# Patient Record
Sex: MALE | Race: WHITE | NOT HISPANIC OR LATINO | URBAN - METROPOLITAN AREA
[De-identification: names, ages, dates, MRNs, and addresses within clinical notes are randomized per-mention and may not be internally consistent; named-entity substitution may affect disease eponyms.]

---

## 2021-02-05 ENCOUNTER — INPATIENT (INPATIENT)
Facility: HOSPITAL | Age: 61
LOS: 0 days | Discharge: HOME | End: 2021-02-06
Payer: MEDICAID

## 2021-02-05 PROCEDURE — 93010 ELECTROCARDIOGRAM REPORT: CPT

## 2021-02-05 PROCEDURE — 70450 CT HEAD/BRAIN W/O DYE: CPT | Mod: 26

## 2021-02-05 PROCEDURE — 71045 X-RAY EXAM CHEST 1 VIEW: CPT | Mod: 26

## 2021-02-05 PROCEDURE — 72125 CT NECK SPINE W/O DYE: CPT | Mod: 26

## 2021-02-05 PROCEDURE — 99285 EMERGENCY DEPT VISIT HI MDM: CPT

## 2021-02-06 PROCEDURE — 93306 TTE W/DOPPLER COMPLETE: CPT | Mod: 26

## 2021-02-06 PROCEDURE — 93880 EXTRACRANIAL BILAT STUDY: CPT | Mod: 26

## 2021-02-06 PROCEDURE — 99223 1ST HOSP IP/OBS HIGH 75: CPT

## 2021-03-08 PROBLEM — Z00.00 ENCOUNTER FOR PREVENTIVE HEALTH EXAMINATION: Status: ACTIVE | Noted: 2021-03-08

## 2022-12-20 ENCOUNTER — INPATIENT (INPATIENT)
Facility: HOSPITAL | Age: 62
LOS: 0 days | Discharge: HOME | DRG: 176 | End: 2022-12-21
Attending: HOSPITALIST | Admitting: HOSPITALIST
Payer: MEDICAID

## 2022-12-20 VITALS
RESPIRATION RATE: 18 BRPM | DIASTOLIC BLOOD PRESSURE: 79 MMHG | SYSTOLIC BLOOD PRESSURE: 132 MMHG | HEART RATE: 63 BPM | OXYGEN SATURATION: 96 % | WEIGHT: 164.91 LBS | TEMPERATURE: 98 F

## 2022-12-20 DIAGNOSIS — I26.99 OTHER PULMONARY EMBOLISM WITHOUT ACUTE COR PULMONALE: ICD-10-CM

## 2022-12-20 DIAGNOSIS — Z98.890 OTHER SPECIFIED POSTPROCEDURAL STATES: Chronic | ICD-10-CM

## 2022-12-20 DIAGNOSIS — R09.89 OTHER SPECIFIED SYMPTOMS AND SIGNS INVOLVING THE CIRCULATORY AND RESPIRATORY SYSTEMS: ICD-10-CM

## 2022-12-20 LAB
ALBUMIN SERPL ELPH-MCNC: 3 G/DL — LOW (ref 3.3–5.2)
ALP SERPL-CCNC: 52 U/L — SIGNIFICANT CHANGE UP (ref 40–120)
ALT FLD-CCNC: 77 U/L — HIGH
ANION GAP SERPL CALC-SCNC: 7 MMOL/L — SIGNIFICANT CHANGE UP (ref 5–17)
APTT BLD: 49.8 SEC — HIGH (ref 27.5–35.5)
APTT BLD: 70.8 SEC — HIGH (ref 27.5–35.5)
AST SERPL-CCNC: 36 U/L — SIGNIFICANT CHANGE UP
BASOPHILS # BLD AUTO: 0.03 K/UL — SIGNIFICANT CHANGE UP (ref 0–0.2)
BASOPHILS NFR BLD AUTO: 0.5 % — SIGNIFICANT CHANGE UP (ref 0–2)
BILIRUB SERPL-MCNC: 0.5 MG/DL — SIGNIFICANT CHANGE UP (ref 0.4–2)
BUN SERPL-MCNC: 18.1 MG/DL — SIGNIFICANT CHANGE UP (ref 8–20)
CALCIUM SERPL-MCNC: 7.9 MG/DL — LOW (ref 8.4–10.5)
CHLORIDE SERPL-SCNC: 113 MMOL/L — HIGH (ref 96–108)
CO2 SERPL-SCNC: 24 MMOL/L — SIGNIFICANT CHANGE UP (ref 22–29)
CREAT SERPL-MCNC: 0.7 MG/DL — SIGNIFICANT CHANGE UP (ref 0.5–1.3)
EGFR: 105 ML/MIN/1.73M2 — SIGNIFICANT CHANGE UP
EOSINOPHIL # BLD AUTO: 0.02 K/UL — SIGNIFICANT CHANGE UP (ref 0–0.5)
EOSINOPHIL NFR BLD AUTO: 0.4 % — SIGNIFICANT CHANGE UP (ref 0–6)
GLUCOSE SERPL-MCNC: 102 MG/DL — HIGH (ref 70–99)
HCT VFR BLD CALC: 33.7 % — LOW (ref 39–50)
HCT VFR BLD CALC: 35 % — LOW (ref 39–50)
HGB BLD-MCNC: 11.2 G/DL — LOW (ref 13–17)
HGB BLD-MCNC: 11.5 G/DL — LOW (ref 13–17)
IMM GRANULOCYTES NFR BLD AUTO: 0.4 % — SIGNIFICANT CHANGE UP (ref 0–0.9)
INR BLD: 1.24 RATIO — HIGH (ref 0.88–1.16)
LYMPHOCYTES # BLD AUTO: 1.34 K/UL — SIGNIFICANT CHANGE UP (ref 1–3.3)
LYMPHOCYTES # BLD AUTO: 24.1 % — SIGNIFICANT CHANGE UP (ref 13–44)
MCHC RBC-ENTMCNC: 31.9 PG — SIGNIFICANT CHANGE UP (ref 27–34)
MCHC RBC-ENTMCNC: 32 PG — SIGNIFICANT CHANGE UP (ref 27–34)
MCHC RBC-ENTMCNC: 32.9 GM/DL — SIGNIFICANT CHANGE UP (ref 32–36)
MCHC RBC-ENTMCNC: 33.2 GM/DL — SIGNIFICANT CHANGE UP (ref 32–36)
MCV RBC AUTO: 96.3 FL — SIGNIFICANT CHANGE UP (ref 80–100)
MCV RBC AUTO: 97.2 FL — SIGNIFICANT CHANGE UP (ref 80–100)
MONOCYTES # BLD AUTO: 0.41 K/UL — SIGNIFICANT CHANGE UP (ref 0–0.9)
MONOCYTES NFR BLD AUTO: 7.4 % — SIGNIFICANT CHANGE UP (ref 2–14)
NEUTROPHILS # BLD AUTO: 3.73 K/UL — SIGNIFICANT CHANGE UP (ref 1.8–7.4)
NEUTROPHILS NFR BLD AUTO: 67.2 % — SIGNIFICANT CHANGE UP (ref 43–77)
NT-PROBNP SERPL-SCNC: 692 PG/ML — HIGH (ref 0–300)
PLATELET # BLD AUTO: 207 K/UL — SIGNIFICANT CHANGE UP (ref 150–400)
PLATELET # BLD AUTO: 240 K/UL — SIGNIFICANT CHANGE UP (ref 150–400)
POTASSIUM SERPL-MCNC: 3.4 MMOL/L — LOW (ref 3.5–5.3)
POTASSIUM SERPL-SCNC: 3.4 MMOL/L — LOW (ref 3.5–5.3)
PROT SERPL-MCNC: 5.2 G/DL — LOW (ref 6.6–8.7)
PROTHROM AB SERPL-ACNC: 14.4 SEC — HIGH (ref 10.5–13.4)
RBC # BLD: 3.5 M/UL — LOW (ref 4.2–5.8)
RBC # BLD: 3.6 M/UL — LOW (ref 4.2–5.8)
RBC # FLD: 12.4 % — SIGNIFICANT CHANGE UP (ref 10.3–14.5)
RBC # FLD: 12.4 % — SIGNIFICANT CHANGE UP (ref 10.3–14.5)
SODIUM SERPL-SCNC: 144 MMOL/L — SIGNIFICANT CHANGE UP (ref 135–145)
TROPONIN T SERPL-MCNC: <0.01 NG/ML — SIGNIFICANT CHANGE UP (ref 0–0.06)
WBC # BLD: 5.55 K/UL — SIGNIFICANT CHANGE UP (ref 3.8–10.5)
WBC # BLD: 6.31 K/UL — SIGNIFICANT CHANGE UP (ref 3.8–10.5)
WBC # FLD AUTO: 5.55 K/UL — SIGNIFICANT CHANGE UP (ref 3.8–10.5)
WBC # FLD AUTO: 6.31 K/UL — SIGNIFICANT CHANGE UP (ref 3.8–10.5)

## 2022-12-20 PROCEDURE — 93970 EXTREMITY STUDY: CPT | Mod: 26

## 2022-12-20 PROCEDURE — 93306 TTE W/DOPPLER COMPLETE: CPT | Mod: 26

## 2022-12-20 PROCEDURE — 99285 EMERGENCY DEPT VISIT HI MDM: CPT

## 2022-12-20 PROCEDURE — 99221 1ST HOSP IP/OBS SF/LOW 40: CPT

## 2022-12-20 PROCEDURE — 99223 1ST HOSP IP/OBS HIGH 75: CPT

## 2022-12-20 PROCEDURE — 93010 ELECTROCARDIOGRAM REPORT: CPT

## 2022-12-20 RX ORDER — HEPARIN SODIUM 5000 [USP'U]/ML
INJECTION INTRAVENOUS; SUBCUTANEOUS
Qty: 25000 | Refills: 0 | Status: DISCONTINUED | OUTPATIENT
Start: 2022-12-20 | End: 2022-12-21

## 2022-12-20 RX ORDER — ALPRAZOLAM 0.25 MG
0.5 TABLET ORAL EVERY 12 HOURS
Refills: 0 | Status: DISCONTINUED | OUTPATIENT
Start: 2022-12-20 | End: 2022-12-21

## 2022-12-20 RX ORDER — MAGNESIUM SULFATE 500 MG/ML
2 VIAL (ML) INJECTION ONCE
Refills: 0 | Status: COMPLETED | OUTPATIENT
Start: 2022-12-20 | End: 2022-12-20

## 2022-12-20 RX ORDER — HEPARIN SODIUM 5000 [USP'U]/ML
6000 INJECTION INTRAVENOUS; SUBCUTANEOUS ONCE
Refills: 0 | Status: DISCONTINUED | OUTPATIENT
Start: 2022-12-20 | End: 2022-12-20

## 2022-12-20 RX ORDER — POTASSIUM CHLORIDE 20 MEQ
40 PACKET (EA) ORAL ONCE
Refills: 0 | Status: COMPLETED | OUTPATIENT
Start: 2022-12-20 | End: 2022-12-20

## 2022-12-20 RX ORDER — HEPARIN SODIUM 5000 [USP'U]/ML
3000 INJECTION INTRAVENOUS; SUBCUTANEOUS EVERY 6 HOURS
Refills: 0 | Status: DISCONTINUED | OUTPATIENT
Start: 2022-12-20 | End: 2022-12-21

## 2022-12-20 RX ORDER — HEPARIN SODIUM 5000 [USP'U]/ML
6000 INJECTION INTRAVENOUS; SUBCUTANEOUS EVERY 6 HOURS
Refills: 0 | Status: DISCONTINUED | OUTPATIENT
Start: 2022-12-20 | End: 2022-12-21

## 2022-12-20 RX ORDER — POTASSIUM CHLORIDE 20 MEQ
10 PACKET (EA) ORAL
Refills: 0 | Status: COMPLETED | OUTPATIENT
Start: 2022-12-20 | End: 2022-12-20

## 2022-12-20 RX ADMIN — Medication 0.5 MILLIGRAM(S): at 23:51

## 2022-12-20 RX ADMIN — Medication 25 GRAM(S): at 12:06

## 2022-12-20 RX ADMIN — Medication 100 MILLIEQUIVALENT(S): at 17:38

## 2022-12-20 RX ADMIN — HEPARIN SODIUM 1500 UNIT(S)/HR: 5000 INJECTION INTRAVENOUS; SUBCUTANEOUS at 23:25

## 2022-12-20 RX ADMIN — HEPARIN SODIUM 1500 UNIT(S)/HR: 5000 INJECTION INTRAVENOUS; SUBCUTANEOUS at 22:00

## 2022-12-20 RX ADMIN — HEPARIN SODIUM 1300 UNIT(S)/HR: 5000 INJECTION INTRAVENOUS; SUBCUTANEOUS at 12:35

## 2022-12-20 RX ADMIN — HEPARIN SODIUM 3000 UNIT(S): 5000 INJECTION INTRAVENOUS; SUBCUTANEOUS at 21:11

## 2022-12-20 RX ADMIN — Medication 40 MILLIEQUIVALENT(S): at 15:41

## 2022-12-20 RX ADMIN — Medication 2 GRAM(S): at 14:00

## 2022-12-20 RX ADMIN — Medication 100 MILLIEQUIVALENT(S): at 15:00

## 2022-12-20 RX ADMIN — Medication 100 MILLIEQUIVALENT(S): at 14:00

## 2022-12-20 NOTE — ED PROVIDER NOTE - OBJECTIVE STATEMENT
62-year-old male no past medical history  transferred from [facility] for bilateral PEs on heparin.  Patient went to [facility] yesterday for altered mental status admitted to taking Gummies marijuana and Xanax.  Had an episode of hypoxia and hypotension in the ED got CTA that showed bilateral PEs.  Patient currently denies any symptoms states that he was just recently admitted to [facility] last week because of the flu and dehydration.   No history of blood clots in the past.  No sick contacts no recent travel. Denies f/c/n/v/cp/sob/palpitations/rash/headache/dizziness/abd.pain/d/c/dysuria/hematuria. notes cough is improving. no si/hi takes xanax as notes a lot is going on in his life so it helps him relax.

## 2022-12-20 NOTE — ED PROVIDER NOTE - CLINICAL SUMMARY MEDICAL DECISION MAKING FREE TEXT BOX
BL PEs with right chambers enlargement transferred on heparin from [facility]. PERT team Dr. Retana aware will see the pt; labs, echo admit currently hemodynamically stable

## 2022-12-20 NOTE — ED PROVIDER NOTE - PROGRESS NOTE DETAILS
stable on reassessment, hemodynamically stable. labs reviewed. echo pending. cardiology consulted with recommendation for admission to telemetry. spoke with hospitalist for admission. -DO Sita

## 2022-12-20 NOTE — H&P ADULT - NSHPLABSRESULTS_GEN_ALL_CORE
CT angio Chest 12/19  FINDINGS:    LUNGS AND AIRWAYS: Patent central airways.  Right lower lobe   bronchiectasis. Scattered subsegmental atelectasis.  PLEURA: No pleural effusion.  MEDIASTINUM AND GERMAN: No lymphadenopathy.  VESSELS: There are filling defects identified within first and second   branches of bilateral upper, lower lobe as well as a right middle lobe   pulmonary arteries and lobar branches, compatible with pulmonary emboli.   Filling defects identified within right and left pulmonary artery, subtle   thrombus difficult to exclude.  HEART: Mild cardiomegaly with enlargement the right cardiac chambers.   Recommend correlation with echocardiogram to exclude right heart strain.   Prominent aortic root measuring up to 3.9 cm. No pericardial effusion.  CHEST WALL AND LOWER NECK: Within normal limits.  VISUALIZED UPPER ABDOMEN: Within normal limits.  BONES: Multilevel degenerative changes of the spine.    IMPRESSION:    Findings compatible with bilateral pulmonary emboli as detailed above.    Mild cardiomegaly with enlargement the right cardiac chambers. Recommend   correlation with echocardiogram to exclude right heart strain.

## 2022-12-20 NOTE — CONSULT NOTE ADULT - SUBJECTIVE AND OBJECTIVE BOX
[facility] - Cardiology Team Note                                                              [facility]/[facility] Faculty Practice                                                         Office:  [address] [facility] [facility]                                                                     Telephone: [phone]. Fax:[phone]                                                                 [facility] CARDIOLOGY CONSULTATION NOTE                                                                                             Consult requested by:  Dr. Ruiz  Reason for Consultation: PE  History obtained by: Patient and medical record   obtained: No  Hospital Transferred from: [facility]  On call accepting attending (if transferred): Dr. Retana    CC:  PE    HPI:    63 y/o M with [facility] Lyme's Dsease who presents to [facility] ED with B/L pulmonary embolism, transfer from [facility]. Per history,      Risk Factors:  Family Hx of Thrombophilia   Recent long haul travel   Immobility   Prior hx of VTE   Cancer screening: Colonoscopy [] Mammogram [] Papsmear [] PSA []      ALLERGIES: Allergies    Allergy Status Unknown    Intolerances          PAST MEDICAL HISTORY  No pertinent past medical history    No pertinent past medical history        PAST SURGICAL HISTORY  S/P hernia repair    H/O shoulder surgery        FAMILY HISTORY:  No pertinent family history in first degree relatives        SOCIAL HISTORY:  Denies smoking/alcohol/drugs  CIGARETTES:     ALCOHOL:  DRUGS:                                        CURRENT MEDICATIONS:     heparin  Infusion.  potassium chloride    Tablet ER  potassium chloride  10 mEq/100 mL IVPB        HOME MEDICATIONS:  Vital Signs Last 24 Hrs  T(C): 36.9 (20 Dec 2022 10:09), Max: 36.9 (20 Dec 2022 10:09)  T(F): 98.4 (20 Dec 2022 10:09), Max: 98.4 (20 Dec 2022 10:09)  HR: 63 (20 Dec 2022 10:09) (63 - 63)  BP: 132/79 (20 Dec 2022 10:09) (132/79 - 132/79)  BP(mean): --  RR: 18 (20 Dec 2022 10:09) (18 - 18)  SpO2: 96% (20 Dec 2022 10:09) (96% - 96%)    Parameters below as of 20 Dec 2022 10:09  Patient On (Oxygen Delivery Method): room air          Review of Systems    [ x] All others negative	  [ ] Unable to obtain    CONSTITUTIONAL: No fever, weight loss, or fatigue  EYES: No eye pain, visual disturbances, or discharge  ENT:  No difficulty hearing, tinnitus, vertigo; No sinus or throat pain  NECK: No pain or stiffness  RESPIRATORY:    CARDIOVASCULAR:    GASTROINTESTINAL: No abdominal or epigastric pain. No nausea, vomiting, or hematemesis; No diarrhea or constipation. No melena or hematochezia.  GENITOURINARY: No dysuria, frequency, hematuria, or incontinence  NEUROLOGICAL: No headaches, memory loss, loss of strength, numbness, or tremors  SKIN:   LYMPH Nodes: No enlarged glands  ENDOCRINE: No heat or cold intolerance; No hair loss  MUSCULOSKELETAL: No joint pain or swelling; No muscle, back, or extremity pain  PSYCHIATRIC: No depression, anxiety, mood swings, or difficulty sleeping  HEME/LYMPH: No easy bruising, or bleeding gums  ALLERGY AND IMMUNOLOGIC: No hives or eczema    PHYSICAL EXAM:  Vital Signs Last 24 Hrs  T(C): 36.9 (20 Dec 2022 10:09), Max: 36.9 (20 Dec 2022 10:09)  T(F): 98.4 (20 Dec 2022 10:09), Max: 98.4 (20 Dec 2022 10:09)  HR: 63 (20 Dec 2022 10:09) (63 - 63)  BP: 132/79 (20 Dec 2022 10:09) (132/79 - 132/79)  BP(mean): --  RR: 18 (20 Dec 2022 10:09) (18 - 18)  SpO2: 96% (20 Dec 2022 10:09) (96% - 96%)    Parameters below as of 20 Dec 2022 10:09  Patient On (Oxygen Delivery Method): room air        Constitutional: Comfortable . No acute distress.   HEENT: Atraumatic and normocephalic , neck is supple . no JVD. No carotid bruit. PEERL   CNS: A&Ox3. No focal deficits. EOMI. Cranial nerves II-IX are intact.   Lymph Nodes: Cervical : Not palpable.  Respiratory: CTAB  Cardiovascular: S1S2 RRR. No murmur/rubs or gallop.  Gastrointestinal: Soft non-tender and non distended . +Bowel sounds. negative Ragland's sign.  Extremities: No edema.   Psychiatric: Calm . no agitation.  Skin: No skin rash/ulcers visualized to face, hands or feet.    Vascular Pulse Exam:  Right DP: []palpable []non-palpable []audible      Left DP :   []palpable []non-palpable []audible  Right PT: []palpable [] non-palpable []audible   Left PT:  [] palpable [] non-palpable []audible         Foot Exam:        Intake and output:     LABS:                        11.2   5.55  )-----------( 207      ( 20 Dec 2022 11:00 )             33.7     12-20    144  |  113<H>  |  18.1  ----------------------------<  102<H>  3.4<L>   |  24.0  |  0.70    Ca    7.9<L>      20 Dec 2022 11:00    TPro  5.2<L>  /  Alb  3.0<L>  /  TBili  0.5  /  DBili  x   /  AST  36  /  ALT  77<H>  /  AlkPhos  52  12-20    CARDIAC MARKERS ( 20 Dec 2022 11:00 )  x     / <0.01 ng/mL / x     / x     / x        ;p-BNP=Serum Pro-Brain Natriuretic Peptide: 692 pg/mL (12-20 @ 11:00)    PT/INR - ( 20 Dec 2022 11:00 )   PT: 14.4 sec;   INR: 1.24 ratio         PTT - ( 20 Dec 2022 11:00 )  PTT:70.8 sec      INTERPRETATION OF TELEMETRY: Reviewed by me.   ECG: Reviewed by me.     PREVIOUS DIAGNOSTIC TESTING  ECHO FINDINGS:      STRESS FINDINGS:      CATHETERIZATION FINDINGS:       US DUPLEX:       CTA OF THE CHEST:       RADIOLOGY & ADDITIONAL STUDIES:    X-ray:  reviewed by me.       Assessment and Plan   HPI    Troponin: ( 20 Dec 2022 11:00 )  Troponin T  <0.01,  CPK  X    , CKMB  X    , <H>        ProBNP: Serum Pro-Brain Natriuretic Peptide: 692 pg/mL (12-20 @ 11:00)    Transthoracic: [RV function and PASP]  US duplex:   sPESI score:     Plan:                                                                             [facility] - Cardiology Team Note                                                              [facility] Faculty Practice                                                         Office:  [address] [facility] [place]                                                                     Telephone: [phone]. Fax:[phone]                                                                 [facility] CARDIOLOGY CONSULTATION NOTE                                                                                             Consult requested by:  Dr. Ruiz  Reason for Consultation: PE  History obtained by: Patient and medical record   obtained: No  Hospital Transferred from: [facility] [place]  On call accepting attending (if transferred): Dr. Retana    CC:  PE    HPI:    63 y/o M with PMH Lyme's Disease who presents to [facility] ED with B/L pulmonary embolism, transfer from [facility] [place] to assess for further intervention. Patient was brought to [facility] yesterday to altered mental status. Patient states he does not remember anything from the previous day except ending up in the hospital. Per hospital records, he allegedly took THC gummies and Xanax. At [facility] [place] [facility] he was found to be hypoxic and hypotensive. CTA reveal bilateral PEs.  Of note, he was at [facility] the previous week due to flu and dehydration. He received IVF and was sent home to rest. 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 He endorses that he sometimes gets stressed out and due to his Lyme's disease, will feel very fatigued when stressed. No pertinent family hx except brother who had CVA 2 years ago and now resides in nursing home.      Risk Factors:  Family Hx of Thrombophilia   Recent long haul travel   Immobility  - after flu infection  Prior hx of VTE   Cancer screening: Colonoscopy [] Mammogram [] Papsmear [] PSA []      ALLERGIES: Allergies    Allergy Status Unknown    Intolerances    PAST MEDICAL HISTORY  Lyme's disease    PAST SURGICAL HISTORY  S/P hernia repair  H/O shoulder surgery    FAMILY HISTORY:  Brother - CVA 2 years ago    SOCIAL HISTORY:  Denies smoking/alcohol/drugs  CIGARETTES:     ALCOHOL:  DRUGS:                                        CURRENT MEDICATIONS:     heparin  Infusion.  potassium chloride    Tablet ER  potassium chloride  10 mEq/100 mL IVPB      HOME MEDICATIONS:  Vital Signs Last 24 Hrs  T(C): 36.9 (20 Dec 2022 10:09), Max: 36.9 (20 Dec 2022 10:09)  T(F): 98.4 (20 Dec 2022 10:09), Max: 98.4 (20 Dec 2022 10:09)  HR: 63 (20 Dec 2022 10:09) (63 - 63)  BP: 132/79 (20 Dec 2022 10:09) (132/79 - 132/79)  BP(mean): --  RR: 18 (20 Dec 2022 10:09) (18 - 18)  SpO2: 96% (20 Dec 2022 10:09) (96% - 96%)    Parameters below as of 20 Dec 2022 10:09  Patient On (Oxygen Delivery Method): room air    Review of Systems    [ x] All others negative	  [ ] Unable to obtain    CONSTITUTIONAL: No fever, weight loss, or fatigue  EYES: No eye pain, visual disturbances, or discharge  ENT:  No difficulty hearing, tinnitus, vertigo; No sinus or throat pain  NECK: No pain or stiffness  RESPIRATORY:    CARDIOVASCULAR:    GASTROINTESTINAL: No abdominal or epigastric pain. No nausea, vomiting, or hematemesis; No diarrhea or constipation. No melena or hematochezia.  GENITOURINARY: No dysuria, frequency, hematuria, or incontinence  NEUROLOGICAL: No headaches, + memory loss (yesterday's events), loss of strength, numbness, or tremors  SKIN:   LYMPH Nodes: No enlarged glands  ENDOCRINE: No heat or cold intolerance; No hair loss  MUSCULOSKELETAL: No joint pain or swelling; No muscle, back, or extremity pain  PSYCHIATRIC: No depression, + anxiety, mood swings, or difficulty sleeping  HEME/LYMPH: No easy bruising, or bleeding gums  ALLERGY AND IMMUNOLOGIC: No hives or eczema    PHYSICAL EXAM:  Vital Signs Last 24 Hrs  T(C): 36.9 (20 Dec 2022 10:09), Max: 36.9 (20 Dec 2022 10:09)  T(F): 98.4 (20 Dec 2022 10:09), Max: 98.4 (20 Dec 2022 10:09)  HR: 63 (20 Dec 2022 10:09) (63 - 63)  BP: 132/79 (20 Dec 2022 10:09) (132/79 - 132/79)  BP(mean): --  RR: 18 (20 Dec 2022 10:09) (18 - 18)  SpO2: 96% (20 Dec 2022 10:09) (96% - 96%)    Parameters below as of 20 Dec 2022 10:09  Patient On (Oxygen Delivery Method): room air    Constitutional: Comfortable . No acute distress.   HEENT: Atraumatic and normocephalic , neck is supple . no JVD. No carotid bruit. PEERL   CNS: A&Ox3. No focal deficits. EOMI. Cranial nerves II-IX are intact.   Lymph Nodes: Cervical : Not palpable.  Respiratory: CTAB  Cardiovascular: S1S2 RRR. No murmur/rubs or gallop.  Gastrointestinal: Soft non-tender and non distended . +Bowel sounds. negative Ragland's sign.  Extremities: No edema.   Psychiatric: Calm . no agitation.  Skin: No skin rash/ulcers visualized to face, hands or feet.    Vascular Pulse Exam:  Right DP: [x]palpable []non-palpable []audible        Left DP :   [x]palpable []non-palpable []audible  Right PT: [x]palpable [] non-palpable []audible     Left PT:  [x] palpable [] non-palpable []audible         Foot Exam:        Intake and output:     LABS:                        11.2   5.55  )-----------( 207      ( 20 Dec 2022 11:00 )             33.7     12-20    144  |  113<H>  |  18.1  ----------------------------<  102<H>  3.4<L>   |  24.0  |  0.70    Ca    7.9<L>      20 Dec 2022 11:00    TPro  5.2<L>  /  Alb  3.0<L>  /  TBili  0.5  /  DBili  x   /  AST  36  /  ALT  77<H>  /  AlkPhos  52  12-20    CARDIAC MARKERS ( 20 Dec 2022 11:00 )  x     / <0.01 ng/mL / x     / x     / x        ;p-BNP=Serum Pro-Brain Natriuretic Peptide: 692 pg/mL (12-20 @ 11:00)    PT/INR - ( 20 Dec 2022 11:00 )   PT: 14.4 sec;   INR: 1.24 ratio         PTT - ( 20 Dec 2022 11:00 )  PTT:70.8 sec      INTERPRETATION OF TELEMETRY: Reviewed by me.   ECG: Reviewed by me.     PREVIOUS DIAGNOSTIC TESTING  ECHO FINDINGS: PENDING      STRESS FINDINGS:      CATHETERIZATION FINDINGS:       US DUPLEX:  PENDING      CTA OF THE CHEST: B/L PE per report from [facility]      RADIOLOGY & ADDITIONAL STUDIES:    X-ray:  reviewed by me.

## 2022-12-20 NOTE — H&P ADULT - ASSESSMENT
62yr old male with no significant past medical history presented as a transfer from [facility] ER for b./l pulmonary embolism for further management.    # Bilateral pulmonary embolism   CTA as above  TElemetry   ECHO , bnp - mildly elevated   Clinically asymptomatic at this time  ct iv heparin gtt  cardio recs   Duplex LE DVT   Possibly dvt 2/2 recent hospital stay, rhabdo, LIANET     # Anxiety with substance abuse  SW consult   xanax 0.5mg tid prn while in house.   will need psych resources in community, counselled      62yr old male with no significant past medical history presented as a transfer from [facility] ER for b./l pulmonary embolism for further management.    # Bilateral pulmonary embolism   CTA as above  TElemetry   ECHO , bnp - mildly elevated   Clinically asymptomatic at this time  ct iv heparin gtt  cardio recs   Duplex LE DVT   Possibly dvt 2/2 recent hospital stay, rhabdo, LIANET     # altered mental state   per patient - he felt confused -  clinically he is AOx3  recent MR head, CT head at [facility] [place] [place] - no abnormalities   check utox, monitor  will get a CT head if any changes in mental state    # Anxiety with substance abuse  SW consult   xanax 0.5mg tid prn while in house.   will need psych resources in community, counselled

## 2022-12-20 NOTE — H&P ADULT - HISTORY OF PRESENT ILLNESS
62yr old male with no significant past medical hisotry presented as a transfer from [facility] ER for b./l pulmonary embolism for further management. Pt was admitted recently from 12/15-12/18 for Flu,  LIANET, rhabdomyolysis, Falls, altered mental state, he reported taking xanax and gummied from a friend as needed basis. he was treated with iv fluids. Clinically improved and was discharged home. He reports yesterday afternoon, he felt foogy and confused, feels that  has not done much for gummies or xanax that he could contribute the confusion to. at [facility] [place], he was found to be hypoxic. CT chest showed b./l PE - he was transferred to [facility], after being accepted by Cardiology. In [facility], His vitals are stable, BNP elevated,trop - negative, EKG - sinus bradycardia. He is being admitted for further care.

## 2022-12-20 NOTE — CONSULT NOTE ADULT - PROBLEM SELECTOR RECOMMENDATION 9
.  - b/l PE on CTA at [facility], found after becoming hypoxic and hypotensive  - was at [facility] ED last week due to flu and dehydration requiring IVF and then was mostly resting at home, less activity than usual  - transferred to [facility]  - trop - x 1  - remains on heparin GTT full AC nomogram  - currently on 2L NC for support. 95% sp02 while on room air  - HR 60s, EKG is pending.  - pending TTE for evaluation of cardiac function and any valvular abnormalities and RV strain  - no indication for intervention at this time, however will await further testing to make decision on intervention    case discussed with Dr. Retana

## 2022-12-20 NOTE — ED ADULT NURSE NOTE - OBJECTIVE STATEMENT
patient was transferred here from [facility] for eval of P.E.'s  as claimed. patient came in with heparin infusion. no complaints at this time as claimed

## 2022-12-20 NOTE — PATIENT PROFILE ADULT - FUNCTIONAL ASSESSMENT - BASIC MOBILITY 6.
4-calculated by average/Not able to assess (calculate score using [facility] averaging method)

## 2022-12-20 NOTE — H&P ADULT - NSHPPHYSICALEXAM_GEN_ALL_CORE
PHYSICAL EXAM:    GENERAL: NAD, well-groomed, well-developed  HEAD:  Atraumatic, Normocephalic  EYES: EOMI, PERRLA, conjunctiva and sclera clear  ENMT: dry mucous membranes, Good dentition, No lesions  NECK: Supple, No JVD, Normal thyroid  NERVOUS SYSTEM:  Alert & Oriented X3, Good concentration; Motor Strength 5/5 B/L upper and lower extremities;   CHEST/LUNG: Clear to percussion bilaterally; No rales, rhonchi  HEART: Regular rate and rhythm; No murmurs  ABDOMEN: Soft, Nontender, Nondistended; Bowel sounds present  EXTREMITIES:  No clubbing, cyanosis, or edema  SKIN: No rashes or lesions

## 2022-12-20 NOTE — ED ADULT TRIAGE NOTE - CHIEF COMPLAINT QUOTE
Pt trx from [facility] with Heparin infusing at 11.5mL/hr with no symptoms at this time. EMS reports pt was found to have multiple b/l PE's. Pt also noted to be FLU+

## 2022-12-20 NOTE — CONSULT NOTE ADULT - NS ATTEND AMEND GEN_ALL_CORE FT
Patient seen and examined at bedside.  Patient was brought to [facility] yesterday to altered mental status. Patient states he does not remember anything from the previous day except ending up in the hospital. Per hospital records, he allegedly took THC gummies and Xanax. At [facility] he was found to be hypoxic and hypotensive. CTA reveal bilateral PEs.  Of note, he was at [facility] the previous week due to flu and dehydration. He received IVF and was sent home to rest. 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   Due to worsening symptoms i.e. exertional shortness of breath presented to the emergency room found to have saddle PE    Troponins negative  Lactate not done   sPESI: 1 (hypoxia 89%) 8.9% risk of death in the “High” risk group (>0 points)  - TTE pending     Saddle PE ? provoked or unprovoked  Patient appears hemodynamically stable with oxygenation at rest 92 to 93% on room air   will need to ambulate around the unit and assess saturations with ambulation   On heparin GGT would recommend to continue for another 24 hours  pending TTE to assess RV size and function  pending US duplex of the bilateral lower extremities   will follow up tomorrow with recs based on the results of tte    Kisha Retana D.O. [facility]  Cardiology/Vascular Cardiology -[facility] Cardiology   Telephone # [phone]

## 2022-12-20 NOTE — CONSULT NOTE ADULT - ASSESSMENT
63 y/o M with PMH Lyme' s Disease who presents to [facility] ED with B/L pulmonary embolism, [place] [facility] [place] assess for further intervention.       Troponin: ( 20 Dec 2022 11:00 )  Troponin T  <0.01,  CPK  X    , CKMB  X    , <H>    ProBNP: Serum Pro-Brain Natriuretic Peptide: 692 pg/mL (12-20 @ 11:00)    Transthoracic: PENDING  US duplex: PENDING  sPESI score: 1.1 % low risk

## 2022-12-20 NOTE — ED PROVIDER NOTE - PHYSICAL EXAMINATION
Head: atraumatic, normacephalic  Face: atraumatic, no crepitus no orbiral/maxillary/mandibular ttp  throat: uvula midline no exudates  eyes: perrla eomi  heart: rrr s1s2  lungs: ctab  abd: soft, nt nd +bs no rebound/guarding no cva ttp  skin: warm  LE: no swelling, no calf ttp  back: no midline cervical/thoracic/lumbar ttp  neuro: aaox 3 cn iii-xii intact strength 5/5 bl

## 2022-12-21 ENCOUNTER — TRANSCRIPTION ENCOUNTER (OUTPATIENT)
Age: 62
End: 2022-12-21

## 2022-12-21 VITALS
HEART RATE: 61 BPM | TEMPERATURE: 98 F | OXYGEN SATURATION: 97 % | DIASTOLIC BLOOD PRESSURE: 88 MMHG | RESPIRATION RATE: 16 BRPM | SYSTOLIC BLOOD PRESSURE: 156 MMHG

## 2022-12-21 LAB
ALBUMIN SERPL ELPH-MCNC: 3.3 G/DL — SIGNIFICANT CHANGE UP (ref 3.3–5.2)
ALP SERPL-CCNC: 49 U/L — SIGNIFICANT CHANGE UP (ref 40–120)
ALT FLD-CCNC: 60 U/L — HIGH
ANION GAP SERPL CALC-SCNC: 9 MMOL/L — SIGNIFICANT CHANGE UP (ref 5–17)
APTT BLD: 65.8 SEC — HIGH (ref 27.5–35.5)
APTT BLD: 74 SEC — HIGH (ref 27.5–35.5)
AST SERPL-CCNC: 23 U/L — SIGNIFICANT CHANGE UP
BASOPHILS # BLD AUTO: 0.03 K/UL — SIGNIFICANT CHANGE UP (ref 0–0.2)
BASOPHILS NFR BLD AUTO: 0.5 % — SIGNIFICANT CHANGE UP (ref 0–2)
BILIRUB SERPL-MCNC: 0.4 MG/DL — SIGNIFICANT CHANGE UP (ref 0.4–2)
BUN SERPL-MCNC: 14.1 MG/DL — SIGNIFICANT CHANGE UP (ref 8–20)
CALCIUM SERPL-MCNC: 8.3 MG/DL — LOW (ref 8.4–10.5)
CHLORIDE SERPL-SCNC: 112 MMOL/L — HIGH (ref 96–108)
CO2 SERPL-SCNC: 23 MMOL/L — SIGNIFICANT CHANGE UP (ref 22–29)
CREAT SERPL-MCNC: 0.7 MG/DL — SIGNIFICANT CHANGE UP (ref 0.5–1.3)
EGFR: 105 ML/MIN/1.73M2 — SIGNIFICANT CHANGE UP
EOSINOPHIL # BLD AUTO: 0.03 K/UL — SIGNIFICANT CHANGE UP (ref 0–0.5)
EOSINOPHIL NFR BLD AUTO: 0.5 % — SIGNIFICANT CHANGE UP (ref 0–6)
GLUCOSE SERPL-MCNC: 98 MG/DL — SIGNIFICANT CHANGE UP (ref 70–99)
HCT VFR BLD CALC: 34.5 % — LOW (ref 39–50)
HCV AB S/CO SERPL IA: 0.15 S/CO — SIGNIFICANT CHANGE UP (ref 0–0.99)
HCV AB SERPL-IMP: SIGNIFICANT CHANGE UP
HGB BLD-MCNC: 11.5 G/DL — LOW (ref 13–17)
IMM GRANULOCYTES NFR BLD AUTO: 0.3 % — SIGNIFICANT CHANGE UP (ref 0–0.9)
LYMPHOCYTES # BLD AUTO: 1.97 K/UL — SIGNIFICANT CHANGE UP (ref 1–3.3)
LYMPHOCYTES # BLD AUTO: 31.1 % — SIGNIFICANT CHANGE UP (ref 13–44)
MCHC RBC-ENTMCNC: 32 PG — SIGNIFICANT CHANGE UP (ref 27–34)
MCHC RBC-ENTMCNC: 33.3 GM/DL — SIGNIFICANT CHANGE UP (ref 32–36)
MCV RBC AUTO: 96.1 FL — SIGNIFICANT CHANGE UP (ref 80–100)
MONOCYTES # BLD AUTO: 0.51 K/UL — SIGNIFICANT CHANGE UP (ref 0–0.9)
MONOCYTES NFR BLD AUTO: 8.1 % — SIGNIFICANT CHANGE UP (ref 2–14)
NEUTROPHILS # BLD AUTO: 3.77 K/UL — SIGNIFICANT CHANGE UP (ref 1.8–7.4)
NEUTROPHILS NFR BLD AUTO: 59.5 % — SIGNIFICANT CHANGE UP (ref 43–77)
PLATELET # BLD AUTO: 223 K/UL — SIGNIFICANT CHANGE UP (ref 150–400)
POTASSIUM SERPL-MCNC: 3.5 MMOL/L — SIGNIFICANT CHANGE UP (ref 3.5–5.3)
POTASSIUM SERPL-SCNC: 3.5 MMOL/L — SIGNIFICANT CHANGE UP (ref 3.5–5.3)
PROT SERPL-MCNC: 5.5 G/DL — LOW (ref 6.6–8.7)
RBC # BLD: 3.59 M/UL — LOW (ref 4.2–5.8)
RBC # FLD: 12.3 % — SIGNIFICANT CHANGE UP (ref 10.3–14.5)
SODIUM SERPL-SCNC: 144 MMOL/L — SIGNIFICANT CHANGE UP (ref 135–145)
WBC # BLD: 6.33 K/UL — SIGNIFICANT CHANGE UP (ref 3.8–10.5)
WBC # FLD AUTO: 6.33 K/UL — SIGNIFICANT CHANGE UP (ref 3.8–10.5)

## 2022-12-21 PROCEDURE — 85610 PROTHROMBIN TIME: CPT

## 2022-12-21 PROCEDURE — 86803 HEPATITIS C AB TEST: CPT

## 2022-12-21 PROCEDURE — 85027 COMPLETE CBC AUTOMATED: CPT

## 2022-12-21 PROCEDURE — 93970 EXTREMITY STUDY: CPT

## 2022-12-21 PROCEDURE — 80053 COMPREHEN METABOLIC PANEL: CPT

## 2022-12-21 PROCEDURE — 36415 COLL VENOUS BLD VENIPUNCTURE: CPT

## 2022-12-21 PROCEDURE — 99239 HOSP IP/OBS DSCHRG MGMT >30: CPT

## 2022-12-21 PROCEDURE — 93005 ELECTROCARDIOGRAM TRACING: CPT

## 2022-12-21 PROCEDURE — 99236 HOSP IP/OBS SAME DATE HI 85: CPT

## 2022-12-21 PROCEDURE — 84484 ASSAY OF TROPONIN QUANT: CPT

## 2022-12-21 PROCEDURE — 83880 ASSAY OF NATRIURETIC PEPTIDE: CPT

## 2022-12-21 PROCEDURE — 93306 TTE W/DOPPLER COMPLETE: CPT

## 2022-12-21 PROCEDURE — 85025 COMPLETE CBC W/AUTO DIFF WBC: CPT

## 2022-12-21 PROCEDURE — 85730 THROMBOPLASTIN TIME PARTIAL: CPT

## 2022-12-21 RX ORDER — APIXABAN 2.5 MG/1
5 TABLET, FILM COATED ORAL
Qty: 60 | Refills: 0
Start: 2022-12-21 | End: 2023-01-19

## 2022-12-21 RX ORDER — APIXABAN 2.5 MG/1
10 TABLET, FILM COATED ORAL EVERY 12 HOURS
Refills: 0 | Status: DISCONTINUED | OUTPATIENT
Start: 2022-12-21 | End: 2022-12-21

## 2022-12-21 RX ADMIN — HEPARIN SODIUM 1500 UNIT(S)/HR: 5000 INJECTION INTRAVENOUS; SUBCUTANEOUS at 06:30

## 2022-12-21 RX ADMIN — APIXABAN 10 MILLIGRAM(S): 2.5 TABLET, FILM COATED ORAL at 15:56

## 2022-12-21 RX ADMIN — HEPARIN SODIUM 1500 UNIT(S)/HR: 5000 INJECTION INTRAVENOUS; SUBCUTANEOUS at 13:07

## 2022-12-21 NOTE — ED CDU PROVIDER INITIAL DAY NOTE - CLINICAL SUMMARY MEDICAL DECISION MAKING FREE TEXT BOX
BL PEs with right chambers enlargement transferred on heparin from [facility]. PERT team Dr. Retana aware will see the pt; labs, echo admit currently hemodynamically stable

## 2022-12-21 NOTE — DISCHARGE NOTE PROVIDER - ATTENDING DISCHARGE PHYSICAL EXAMINATION:
PHYSICAL EXAM:    GENERAL: NAD, well-groomed, well-developed  HEAD:  Atraumatic, Normocephalic  EYES: EOMI, PERRLA, conjunctiva and sclera clear  NECK: Supple, No JVD  NERVOUS SYSTEM:  Alert & Oriented X3, Good concentration; Motor Strength 5/5 B/L upper and lower extremities  CHEST/LUNG: Clear to percussion bilaterally; No rales, rhonchi  HEART: Regular rate and rhythm; No murmurs  EXTREMITIES:  No clubbing, cyanosis, or edema  SKIN: No rashes or lesions

## 2022-12-21 NOTE — DISCHARGE NOTE PROVIDER - NSDCCPCAREPLAN_GEN_ALL_CORE_FT
PRINCIPAL DISCHARGE DIAGNOSIS  Diagnosis: Pulmonary embolism, bilateral  Assessment and Plan of Treatment: You have been started on Elquis.  Follow dosing as discussed.  Follow up outpt with cardiology upon discharge.  Return to ED if worsening Shortness of breath, chest pain/tightness.      SECONDARY DISCHARGE DIAGNOSES  Diagnosis: Altered mental state  Assessment and Plan of Treatment: No abnormalities noted on imagining.  Resolved.  Follow up with PCP in 1 week.    Diagnosis: Anxiety  Assessment and Plan of Treatment: Substance cessation discussed. Resources given.  Follow up with PCP in 1 week

## 2022-12-21 NOTE — DISCHARGE NOTE PROVIDER - HOSPITAL COURSE
62yr old male with no significant PMH presented as a transfer from [facility] ER for b/l pulmonary embolism for further management. Pt was admitted recently from 12/15-12/18 for Flu,  LIANET, rhabdomyolysis, Falls, altered mental state, he reported taking xanax and gummies from a friend as needed basis. he was treated with iv fluids. Clinically improved and was discharged home. He reports yesterday afternoon 12/20/22 he felt foggy and confused, felt that he has not done much for gummies or xanax that he could contribute the confusion.  At [facility] ER, he was found to be hypoxic. CT chest showed b/l PE and was transferred he was transferred to [facility] While in  ED, his vitals are stable, BNP elevated, trop - negative, EKG - sinus bradycardia, and started on heparin ggt and seen by cardiology.  TTE was preformed on day of discharge and without RV strain.  Pt at this time is hemodynamically stable and was transitioned to oral AC.  Pt not requiring and Oxygen and at this time stable for discharge and to follow up outpt with cardiology. Patient seen and examined @ the bedside today. Patient clinically stable at this time. No longer requires acute in hospital level of care. Can be continually followed/managed as an outpatient. Please see discharge summary for further information including today's vitals and physical exam.

## 2022-12-21 NOTE — ED CDU PROVIDER INITIAL DAY NOTE - OBJECTIVE STATEMENT
62-year-old male no past medical history  transferred from [place] for bilateral PEs on heparin.  Patient went to [place] yesterday for altered mental status admitted to taking Gummies marijuana and Xanax.  Had an episode of hypoxia and hypotension in the ED got CTA that showed bilateral PEs.  Patient currently denies any symptoms states that he was just recently admitted to [place] last week because of the flu and dehydration.   No history of blood clots in the past.  No sick contacts no recent travel. Denies f/c/n/v/cp/sob/palpitations/rash/headache/dizziness/abd.pain/d/c/dysuria/hematuria. notes cough is improving. no si/hi takes xanax as notes a lot is going on in his life so it helps him relax.

## 2022-12-21 NOTE — PROGRESS NOTE ADULT - ASSESSMENT
Assessment and Plan   61 y/o M with PMH Lyme's Disease who presents to [facility] ED with B/L pulmonary embolism, transfer from [facility] to assess for further intervention. Patient was brought to [facility] yesterday to altered mental status; presents with saddle PE     Troponin: negative   ProBNP: 692  Transthoracic: Normal RV function and size, PASP 61 mmhG   US duplex: No evidence of deep venous thrombosis in either lower extremity.  sPESI score: 1 8.9% risk of death in the “High” risk group (>0 points)    Plan:   - on Hep ggt   -  Assessment and Plan   61 y/o M with PMH Lyme's Disease who presents to [facility] ED with B/L pulmonary embolism, transfer from [facility] to assess for further intervention. Patient was brought to [facility] yesterday to altered mental status; presents with saddle PE     Troponin: negative   ProBNP: 692  Transthoracic: Normal RV function and size, PASP 61 mmhG   US duplex: No evidence of deep venous thrombosis in either lower extremity.  sPESI score: 1 8.9% risk of death in the “High” risk group (>0 points)    Plan:   - on Hep ggt can be transitioned to Eliquis 10mg po q12hrs for 7 days and then 5mg po q 12hrs   - unprovoked (as previous hospitalization is a weak indication or cause for DVT/PE as patient received DVT prophylaxis) DVT PE will need outpatient consultation with Heme onc in the outpatient setting   -TTE done shows normal RV function and size with PH (61 mmHg)  - patient is hemodynamically stable with, negative troponins and no indication for mechanical intervention at this time   - can be discharged home     will need follow up Cardiologist in the outpatient and Deanne Retana D.O. [facility]  Cardiology/Vascular Cardiology -[facility] Cardiology   Telephone # [phone]

## 2022-12-21 NOTE — PROGRESS NOTE ADULT - SUBJECTIVE AND OBJECTIVE BOX
[facility] - Cardiology Team Progress Note                                                    [facility] Faculty Practice                                                Office:  [address] [facility] [facility]                                                        Telephone: [phone]. Fax:[phone]                                                       [facility] CARDIOLOGY PROGRESS NOTE                                                                                             CC:  Bilateral PE   Interval Events:   Patient transferred from [place] and for [facility] eval   TTE done shows normal RV function and size with negative trops   CURRENT MEDICATIONS:     heparin  Infusion.        HOME MEDICATIONS:  Vital Signs Last 24 Hrs  T(C): 36.7 (21 Dec 2022 04:16), Max: 37.7 (20 Dec 2022 21:29)  T(F): 98 (21 Dec 2022 04:16), Max: 99.9 (20 Dec 2022 21:29)  HR: 55 (21 Dec 2022 04:16) (55 - 85)  BP: 132/73 (21 Dec 2022 04:16) (116/70 - 146/76)  BP(mean): --  RR: 16 (21 Dec 2022 04:16) (16 - 20)  SpO2: 95% (21 Dec 2022 04:16) (92% - 96%)    Parameters below as of 21 Dec 2022 04:16  Patient On (Oxygen Delivery Method): room air          Review of Systems    [ x] All others negative	  [ ] Unable to obtain    CONSTITUTIONAL: No fever, weight loss, or fatigue  EYES: No eye pain, visual disturbances, or discharge  ENT:  No difficulty hearing, tinnitus, vertigo; No sinus or throat pain  NECK: No pain or stiffness  RESPIRATORY:  no shortness of breath or chest pain or coughing   CARDIOVASCULAR:  no chest pain or shortness of breath or lower extremity edema   GASTROINTESTINAL: No abdominal or epigastric pain. No nausea, vomiting, or hematemesis; No diarrhea or constipation. No melena or hematochezia.  GENITOURINARY: No dysuria, frequency, hematuria, or incontinence  NEUROLOGICAL: No headaches, memory loss, loss of strength, numbness, or tremors  SKIN:   LYMPH Nodes: No enlarged glands  ENDOCRINE: No heat or cold intolerance; No hair loss  MUSCULOSKELETAL: No joint pain or swelling; No muscle, back, or extremity pain  PSYCHIATRIC: No depression, anxiety, mood swings, or difficulty sleeping  HEME/LYMPH: No easy bruising, or bleeding gums  ALLERGY AND IMMUNOLOGIC: No hives or eczema    PHYSICAL EXAM:  Vital Signs Last 24 Hrs  T(C): 36.7 (21 Dec 2022 04:16), Max: 37.7 (20 Dec 2022 21:29)  T(F): 98 (21 Dec 2022 04:16), Max: 99.9 (20 Dec 2022 21:29)  HR: 55 (21 Dec 2022 04:16) (55 - 85)  BP: 132/73 (21 Dec 2022 04:16) (116/70 - 146/76)  BP(mean): --  RR: 16 (21 Dec 2022 04:16) (16 - 20)  SpO2: 95% (21 Dec 2022 04:16) (92% - 96%)    Parameters below as of 21 Dec 2022 04:16  Patient On (Oxygen Delivery Method): room air        Constitutional: Comfortable . No acute distress.   HEENT: Atraumatic and normocephalic , neck is supple . no JVD. No carotid bruit. PEERL   CNS: A&Ox3. No focal deficits. EOMI. Cranial nerves II-IX are intact.   Lymph Nodes: Cervical : Not palpable.  Respiratory: CTAB  Cardiovascular: S1S2 RRR. No murmur/rubs or gallop.  Gastrointestinal: Soft non-tender and non distended . +Bowel sounds. negative Ragland's sign.  Extremities: No edema.   Psychiatric: Calm . no agitation.  Skin: No skin rash/ulcers visualized to face, hands or feet.    Vascular Pulse Exam:  Right DP: []palpable []non-palpable []audible      Left DP :   []palpable []non-palpable []audible  Right PT: []palpable [] non-palpable []audible   Left PT:  [] palpable [] non-palpable []audible         Foot Exam:        Intake and output:     LABS:                        11.5   6.33  )-----------( 223      ( 21 Dec 2022 04:50 )             34.5     12-21    144  |  112<H>  |  14.1  ----------------------------<  98  3.5   |  23.0  |  0.70    Ca    8.3<L>      21 Dec 2022 04:50    TPro  5.5<L>  /  Alb  3.3  /  TBili  0.4  /  DBili  x   /  AST  23  /  ALT  60<H>  /  AlkPhos  49  12-21    CARDIAC MARKERS ( 20 Dec 2022 11:00 )  x     / <0.01 ng/mL / x     / x     / x        ;p-BNP=Serum Pro-Brain Natriuretic Peptide: 692 pg/mL (12-20 @ 11:00)    PT/INR - ( 20 Dec 2022 11:00 )   PT: 14.4 sec;   INR: 1.24 ratio         PTT - ( 21 Dec 2022 12:18 )  PTT:74.0 sec      INTERPRETATION OF TELEMETRY: Reviewed by me.   ECG: Reviewed by me.     PREVIOUS DIAGNOSTIC TESTING  ECHO FINDINGS:  < from: TTE Echo Complete w/o Contrast w/ Doppler (12.20.22 @ 16:14) >  Summary:   1. Left ventricular ejection fraction, by visual estimation, is 60 to   65%.   2. Normal global left ventricular systolic function.   3. Normal right ventricular size and function.   4. Mildly enlarged right atrium.   5. Normal left atrial size.   6. Mild mitral annular calcification.   7. Trace mitral valve regurgitation.   8. Mild-moderate tricuspid regurgitation.   9. Estimated pulmonary artery systolic pressure is 61.0 mmHg assuming a   right atrial pressure of 8 mmHg, which is consistent with severe   pulmonary hypertension.    < end of copied text >      STRESS FINDINGS:      CATHETERIZATION FINDINGS:       US DUPLEX:       CTA OF THE CHEST:   Saddle PE     RADIOLOGY & ADDITIONAL STUDIES:    X-ray:  reviewed by me.     Tawanna RPE (Rate of percieved exertion scale) :    mMRC score:    Dyspnea only with strenuous exercise 0         Dyspnea when hurrying or walking up a hill 1    Walks slower than people of the same age or has to stop for breath when walking at own pace 2    Stops for breathe after walking 100 yards or after few minutes 3    Too dyspneic to leave house or breathless when dressing 4      Pre HR:    Pre SBP:    Pre SpO2: on RA, on L O2    Tawanna Pre:    Post HR:    Post SBP:    Post SpO2:    Tawanna Post:    Completed 6 min? [ ] Yes [ ] No    If no, why?    Total time walked:    Distance walked (m):                                                                    [facility] - Cardiology Team Progress Note                                                    [facility] Faculty Practice                                                Office:  [address] [facility] [facility]                                                        Telephone: [phone]. Fax:[phone]                                                       [facility] CARDIOLOGY PROGRESS NOTE                                                                                             CC:  Bilateral PE   Interval Events:   Patient transferred from [place] and for [facility] eval   TTE done shows normal RV function and size with negative trops   No chest pain or shortness of breath   CURRENT MEDICATIONS:     heparin  Infusion.        HOME MEDICATIONS:  Vital Signs Last 24 Hrs  T(C): 36.7 (21 Dec 2022 04:16), Max: 37.7 (20 Dec 2022 21:29)  T(F): 98 (21 Dec 2022 04:16), Max: 99.9 (20 Dec 2022 21:29)  HR: 55 (21 Dec 2022 04:16) (55 - 85)  BP: 132/73 (21 Dec 2022 04:16) (116/70 - 146/76)  BP(mean): --  RR: 16 (21 Dec 2022 04:16) (16 - 20)  SpO2: 95% (21 Dec 2022 04:16) (92% - 96%)    Parameters below as of 21 Dec 2022 04:16  Patient On (Oxygen Delivery Method): room air          Review of Systems    [ x] All others negative	  [ ] Unable to obtain    CONSTITUTIONAL: No fever, weight loss, or fatigue  EYES: No eye pain, visual disturbances, or discharge  ENT:  No difficulty hearing, tinnitus, vertigo; No sinus or throat pain  NECK: No pain or stiffness  RESPIRATORY:  no shortness of breath or chest pain or coughing   CARDIOVASCULAR:  no chest pain or shortness of breath or lower extremity edema   GASTROINTESTINAL: No abdominal or epigastric pain. No nausea, vomiting, or hematemesis; No diarrhea or constipation. No melena or hematochezia.  GENITOURINARY: No dysuria, frequency, hematuria, or incontinence  NEUROLOGICAL: No headaches, memory loss, loss of strength, numbness, or tremors  SKIN:   LYMPH Nodes: No enlarged glands  ENDOCRINE: No heat or cold intolerance; No hair loss  MUSCULOSKELETAL: No joint pain or swelling; No muscle, back, or extremity pain  PSYCHIATRIC: No depression, anxiety, mood swings, or difficulty sleeping  HEME/LYMPH: No easy bruising, or bleeding gums  ALLERGY AND IMMUNOLOGIC: No hives or eczema    PHYSICAL EXAM:  Vital Signs Last 24 Hrs  T(C): 36.7 (21 Dec 2022 04:16), Max: 37.7 (20 Dec 2022 21:29)  T(F): 98 (21 Dec 2022 04:16), Max: 99.9 (20 Dec 2022 21:29)  HR: 55 (21 Dec 2022 04:16) (55 - 85)  BP: 132/73 (21 Dec 2022 04:16) (116/70 - 146/76)  BP(mean): --  RR: 16 (21 Dec 2022 04:16) (16 - 20)  SpO2: 95% (21 Dec 2022 04:16) (92% - 96%)    Parameters below as of 21 Dec 2022 04:16  Patient On (Oxygen Delivery Method): room air        Constitutional: Comfortable . No acute distress.   HEENT: Atraumatic and normocephalic , neck is supple . no JVD. No carotid bruit. PEERL   CNS: A&Ox3. No focal deficits. EOMI. Cranial nerves II-IX are intact.   Lymph Nodes: Cervical : Not palpable.  Respiratory: CTAB  Cardiovascular: S1S2 RRR. No murmur/rubs or gallop.  Gastrointestinal: Soft non-tender and non distended . +Bowel sounds. negative Ragland's sign.  Extremities: No edema.   Psychiatric: Calm . no agitation.  Skin: No skin rash/ulcers visualized to face, hands or feet.    Vascular Pulse Exam:  Right DP: []palpable []non-palpable []audible      Left DP :   []palpable []non-palpable []audible  Right PT: []palpable [] non-palpable []audible   Left PT:  [] palpable [] non-palpable []audible         Foot Exam:        Intake and output:     LABS:                        11.5   6.33  )-----------( 223      ( 21 Dec 2022 04:50 )             34.5     12-21    144  |  112<H>  |  14.1  ----------------------------<  98  3.5   |  23.0  |  0.70    Ca    8.3<L>      21 Dec 2022 04:50    TPro  5.5<L>  /  Alb  3.3  /  TBili  0.4  /  DBili  x   /  AST  23  /  ALT  60<H>  /  AlkPhos  49  12-21    CARDIAC MARKERS ( 20 Dec 2022 11:00 )  x     / <0.01 ng/mL / x     / x     / x        ;p-BNP=Serum Pro-Brain Natriuretic Peptide: 692 pg/mL (12-20 @ 11:00)    PT/INR - ( 20 Dec 2022 11:00 )   PT: 14.4 sec;   INR: 1.24 ratio         PTT - ( 21 Dec 2022 12:18 )  PTT:74.0 sec      INTERPRETATION OF TELEMETRY: Reviewed by me.   ECG: Reviewed by me.     PREVIOUS DIAGNOSTIC TESTING  ECHO FINDINGS:  < from: TTE Echo Complete w/o Contrast w/ Doppler (12.20.22 @ 16:14) >  Summary:   1. Left ventricular ejection fraction, by visual estimation, is 60 to   65%.   2. Normal global left ventricular systolic function.   3. Normal right ventricular size and function.   4. Mildly enlarged right atrium.   5. Normal left atrial size.   6. Mild mitral annular calcification.   7. Trace mitral valve regurgitation.   8. Mild-moderate tricuspid regurgitation.   9. Estimated pulmonary artery systolic pressure is 61.0 mmHg assuming a   right atrial pressure of 8 mmHg, which is consistent with severe   pulmonary hypertension.    < end of copied text >      STRESS FINDINGS:      CATHETERIZATION FINDINGS:       US DUPLEX:       CTA OF THE CHEST:   Saddle PE     RADIOLOGY & ADDITIONAL STUDIES:    X-ray:  reviewed by me.     Tawanna RPE (Rate of percieved exertion scale) :    mMRC score:    Dyspnea only with strenuous exercise 0         Dyspnea when hurrying or walking up a hill 1    Walks slower than people of the same age or has to stop for breath when walking at own pace 2    Stops for breathe after walking 100 yards or after few minutes 3    Too dyspneic to leave house or breathless when dressing 4      Pre HR:    Pre SBP:    Pre SpO2: on RA, on L O2    Tawanna Pre:    Post HR:    Post SBP:    Post SpO2:    Tawanna Post:    Completed 6 min? [ ] Yes [ ] No    If no, why?    Total time walked:    Distance walked (m):

## 2022-12-21 NOTE — DISCHARGE NOTE PROVIDER - CARE PROVIDER_API CALL
Kisha Retana (DO)  Cardiology; Internal Medicine  [address] [place] [address]  Phone: [phone]  Fax: [phone]  Follow Up Time: 2 weeks

## 2022-12-21 NOTE — DISCHARGE NOTE PROVIDER - NSDCMRMEDTOKEN_GEN_ALL_CORE_FT
Eliquis Starter Pack for Treatment of DVT and PE 5 mg oral tablet: 5 milligram(s) orally 2 times a day

## 2022-12-21 NOTE — DISCHARGE NOTE NURSING/CASE MANAGEMENT/SOCIAL WORK - PATIENT PORTAL LINK FT
You can access the FollowMyHealth Patient Portal offered by [facility] by registering at the following website: [email or web address]. By joining [facility]’s FollowMyHealth portal, you will also be able to view your health information using other applications (apps) compatible with our system.

## 2022-12-22 RX ORDER — APIXABAN 2.5 MG/1
5 TABLET, FILM COATED ORAL
Qty: 60 | Refills: 0
Start: 2022-12-22 | End: 2023-01-20

## 2022-12-22 RX ORDER — APIXABAN 2.5 MG/1
1 TABLET, FILM COATED ORAL
Qty: 60 | Refills: 0
Start: 2022-12-22 | End: 2023-01-20

## 2023-01-09 ENCOUNTER — APPOINTMENT (OUTPATIENT)
Dept: CARDIOLOGY | Facility: CLINIC | Age: 63
End: 2023-01-09
